# Patient Record
Sex: MALE | Race: WHITE | NOT HISPANIC OR LATINO
[De-identification: names, ages, dates, MRNs, and addresses within clinical notes are randomized per-mention and may not be internally consistent; named-entity substitution may affect disease eponyms.]

---

## 2017-01-08 ENCOUNTER — RX RENEWAL (OUTPATIENT)
Age: 72
End: 2017-01-08

## 2017-02-14 ENCOUNTER — MEDICATION RENEWAL (OUTPATIENT)
Age: 72
End: 2017-02-14

## 2017-03-06 ENCOUNTER — APPOINTMENT (OUTPATIENT)
Dept: UROLOGY | Facility: CLINIC | Age: 72
End: 2017-03-06

## 2017-12-12 ENCOUNTER — MEDICATION RENEWAL (OUTPATIENT)
Age: 72
End: 2017-12-12

## 2018-03-02 ENCOUNTER — MEDICATION RENEWAL (OUTPATIENT)
Age: 73
End: 2018-03-02

## 2018-05-01 ENCOUNTER — APPOINTMENT (OUTPATIENT)
Dept: UROLOGY | Facility: CLINIC | Age: 73
End: 2018-05-01
Payer: COMMERCIAL

## 2018-05-01 VITALS
SYSTOLIC BLOOD PRESSURE: 128 MMHG | HEART RATE: 62 BPM | RESPIRATION RATE: 17 BRPM | DIASTOLIC BLOOD PRESSURE: 75 MMHG | HEIGHT: 69 IN | WEIGHT: 175 LBS | BODY MASS INDEX: 25.92 KG/M2 | TEMPERATURE: 98.2 F

## 2018-05-01 DIAGNOSIS — Z78.9 OTHER SPECIFIED HEALTH STATUS: ICD-10-CM

## 2018-05-01 PROCEDURE — 99214 OFFICE O/P EST MOD 30 MIN: CPT

## 2018-05-04 LAB — PSA SERPL-MCNC: 0.78 NG/ML

## 2019-05-23 ENCOUNTER — APPOINTMENT (OUTPATIENT)
Dept: UROLOGY | Facility: CLINIC | Age: 74
End: 2019-05-23
Payer: COMMERCIAL

## 2019-05-23 PROCEDURE — 99213 OFFICE O/P EST LOW 20 MIN: CPT

## 2019-06-20 ENCOUNTER — APPOINTMENT (OUTPATIENT)
Dept: UROLOGY | Facility: CLINIC | Age: 74
End: 2019-06-20

## 2019-06-24 ENCOUNTER — APPOINTMENT (OUTPATIENT)
Dept: UROLOGY | Facility: CLINIC | Age: 74
End: 2019-06-24
Payer: COMMERCIAL

## 2019-06-24 PROCEDURE — 99214 OFFICE O/P EST MOD 30 MIN: CPT

## 2019-06-24 NOTE — LETTER BODY
[FreeTextEntry1] : Victor M Mares MD\par [address]\par [address]\[place] [address]\par P [phone]\par F [phone]\par \par Dr. Luca Chicas\par [address] [facility]\par [place], [place] \par \par Dear Dr. Mares,\par \par Reason for Visit: BPH. Bladder diverticulae. Bladder tumor\par \par This is a 74 -year-old gentleman with BPH and multiple bladder diverticula. Patient was found to bladder lesions within the bladder diverticulae underwent recent bladder biopsy and resection of these lesions. Patient returns for followup. Since the patient's last visit, patient is taking Flomax BID and Proscar regularly without any side effects or difficulties with the medication. He reports stable urinary symptoms with medication. His hematuria has resolved. Patient denies any changes in health. Patient denies any fevers chills or dysuria.The past medical history, family history and social history are unchanged. All other review of systems are negative. Patient denies any changes in medications. Medication list was reconciled.\par \par On examination, the patient is a healthy-appearing gentleman in no acute distress. He is alert and oriented follows commands. He has normal mood and affect. He is normocephalic. Oral no thrush. Neck is supple. Respirations are unlabored. His abdomen is soft and nontender. Liver is nonpalpable. Bladder is nonpalpable. No CVA tenderness. Neurologically he is grossly intact. No peripheral edema. Skin without gross abnormality. He has normal male external genitalia. Normal meatus. Bilateral testes are descended intrascrotally and normal to palpation. On rectal examination, there is normal sphincter tone. The prostate is clinically benign without focal induration or nodularity.\par \par Pathology demonstrated no evidence of bladder malignancy. 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 Bladder diverticula.\par \par I counseled the patient. I reassured the patient. There is no evidence of malignancy. I encouraged him to call Dr. Chicas for bladder outlet procedure. I encourage the patient to undergo Greenlight laser vaporization of prostate.  I counseled the patient regarding the procedure. The risks and benefits were discussed. Alternatives were given. I answered the patient questions. The patient will take the necessary preparations for the procedure. The patient will follow-up as directed and will contact me with any questions or concerns. Thank you for the opportunity to participate in the care of this patient. I'll keep you updated on his progress.\par \par Plan: Continue Flomax and Proscar. Consider Greenlight laser vaporization of prostate. Follow up as directed.

## 2019-06-24 NOTE — ADDENDUM
[FreeTextEntry1] : Entered by Cony Benjamin, acting as scribe for Dr. Salbador Angel.\par \par The documentation recorded by the scribe accurately reflects the service I personally performed and the decisions made by me.

## 2021-01-22 ENCOUNTER — APPOINTMENT (OUTPATIENT)
Dept: UROLOGY | Facility: CLINIC | Age: 76
End: 2021-01-22
Payer: COMMERCIAL

## 2021-01-22 VITALS
RESPIRATION RATE: 18 BRPM | TEMPERATURE: 97.7 F | WEIGHT: 166 LBS | OXYGEN SATURATION: 98 % | SYSTOLIC BLOOD PRESSURE: 164 MMHG | HEART RATE: 87 BPM | DIASTOLIC BLOOD PRESSURE: 90 MMHG | BODY MASS INDEX: 24.51 KG/M2

## 2021-01-22 PROCEDURE — 52000 CYSTOURETHROSCOPY: CPT

## 2021-01-22 PROCEDURE — 99072 ADDL SUPL MATRL&STAF TM PHE: CPT

## 2021-01-22 PROCEDURE — 99214 OFFICE O/P EST MOD 30 MIN: CPT | Mod: 25

## 2021-01-22 RX ORDER — CIPROFLOXACIN HYDROCHLORIDE 500 MG/1
500 TABLET, FILM COATED ORAL
Qty: 2 | Refills: 0 | Status: ACTIVE | COMMUNITY
Start: 2021-01-22

## 2021-01-22 RX ORDER — CIPROFLOXACIN HYDROCHLORIDE 500 MG/1
500 TABLET, FILM COATED ORAL
Refills: 0 | Status: COMPLETED | OUTPATIENT
Start: 2021-01-22

## 2021-01-22 NOTE — ADDENDUM
[FreeTextEntry1] : Entered by Alberto Colon, acting as scribe for Dr. Salbador Angel.\par \par The documentation recorded by the scribe accurately reflects the service I personally performed and the decisions made by me.\par

## 2021-01-22 NOTE — LETTER BODY
[FreeTextEntry1] : Victor M Mares MD\par [address]\par [address]\[place] [address]\par P [phone]\par F [phone]\par \par Dr. Luca Chicas\par [address] [facility]\par [place], [place] \par \par Dear Dr. Mares,\par \par Reason for Visit: BPH. Bladder diverticula. Bladder tumor. Gross hematuria.\par \par This is a 75 year-old gentleman with BPH and multiple bladder diverticula. Patient was found to bladder lesions within the bladder diverticula underwent recent bladder biopsy and resection of these lesions. He returns for follow-up. Since he was last seen, the patient reports gross hematuria. He continues to take Flomax BID and Proscar regularly without any side effects or difficulties with the medications. He reports of stable urinary symptoms with the medications. Patient denies any changes in health. Patient denies any fevers chills or dysuria.The past medical history, family history and social history are unchanged. All other review of systems are negative. Patient denies any changes in medications. Medication list was reconciled.\par \par On examination, the patient is a healthy-appearing gentleman in no acute distress. He is alert and oriented follows commands. He has normal mood and affect. He is normocephalic. Oral no thrush. Neck is supple. Respirations are unlabored. His abdomen is soft and nontender. Liver is nonpalpable. Bladder is nonpalpable. No CVA tenderness. Neurologically he is grossly intact. No peripheral edema. Skin without gross abnormality. He has normal male external genitalia. Normal meatus. Bilateral testes are descended intrascrotally and normal to palpation. On rectal examination, there is normal sphincter tone. The prostate is clinically benign without focal induration or nodularity.\par \par Assessment: BPH, stable symptoms on Flomax BID and Proscar. Bladder diverticula. Gross hematuria.\par \par I counseled the patient. In terms of his BPH, his symptoms are stable on medical therapy. I recommended he continue taking Flomax BID and Proscar. I renewed the patient's prescription for Flomax and Proscar today. I encouraged the patient to continue medications regularly as directed. In terms of his gross hematuria, I recommended the patient obtain urinalysis, urine cytology, urine culture, renal ultrasound, and cystoscopy. He will also obtain BMP and PSA today to ensure stability. Risks and alternatives were discussed. I answered the patient questions. The patient will follow-up as directed and will contact me with any questions or concerns. Thank you for the opportunity to participate in the care of Mr. DICKERSON. I will keep you updated on his progress.\par \par Plan: Continue Flomax and Proscar. Urinalysis. Urine cytology. Urine culture. BMP. PSA. Renal ultrasound. Cystoscopy.\par \par I personally reviewed ultrasound images with the patient today and images demonstrated both kidneys are normal in size and echogenicity without hydronephrosis, stones or solid masses present.\par \par His cystoscopy today demonstrated bladder outlet obstruction and neurogenic bladder.\par \par I discussed with the patient that his cystoscopy today demonstrated bladder outlet obstruction and neurogenic bladder. I recommended the patient undergo GreenLight laser vaporization of prostate. I counseled the patient regarding the procedure. The risks and benefits were discussed. Alternatives were given. I answered the patient questions. The patient will consider the procedure.\par \par Plan: Consider GreenLight laser vaporization of prostate. Follow-up as directed.

## 2021-01-23 ENCOUNTER — TRANSCRIPTION ENCOUNTER (OUTPATIENT)
Age: 76
End: 2021-01-23

## 2021-01-24 LAB
ANION GAP SERPL CALC-SCNC: 13 MMOL/L
APPEARANCE: ABNORMAL
BACTERIA UR CULT: NORMAL
BACTERIA: NEGATIVE
BILIRUBIN URINE: NEGATIVE
BLOOD URINE: ABNORMAL
BUN SERPL-MCNC: 14 MG/DL
CALCIUM SERPL-MCNC: 9.1 MG/DL
CHLORIDE SERPL-SCNC: 102 MMOL/L
CO2 SERPL-SCNC: 26 MMOL/L
COLOR: NORMAL
CREAT SERPL-MCNC: 1.04 MG/DL
GLUCOSE QUALITATIVE U: NEGATIVE
GLUCOSE SERPL-MCNC: 114 MG/DL
HYALINE CASTS: 3 /LPF
KETONES URINE: NEGATIVE
LEUKOCYTE ESTERASE URINE: ABNORMAL
MICROSCOPIC-UA: NORMAL
NITRITE URINE: NEGATIVE
PH URINE: 6.5
POTASSIUM SERPL-SCNC: 4.9 MMOL/L
PROTEIN URINE: ABNORMAL
PSA FREE FLD-MCNC: 11 %
PSA FREE SERPL-MCNC: 0.16 NG/ML
PSA SERPL-MCNC: 1.45 NG/ML
RED BLOOD CELLS URINE: 80 /HPF
SODIUM SERPL-SCNC: 141 MMOL/L
SPECIFIC GRAVITY URINE: 1.01
SQUAMOUS EPITHELIAL CELLS: 3 /HPF
UROBILINOGEN URINE: NORMAL
WHITE BLOOD CELLS URINE: 14 /HPF

## 2021-01-26 LAB — URINE CYTOLOGY: NORMAL

## 2021-01-27 ENCOUNTER — NON-APPOINTMENT (OUTPATIENT)
Age: 76
End: 2021-01-27

## 2022-02-11 ENCOUNTER — RX RENEWAL (OUTPATIENT)
Age: 77
End: 2022-02-11

## 2023-03-02 ENCOUNTER — APPOINTMENT (OUTPATIENT)
Dept: UROLOGY | Facility: CLINIC | Age: 78
End: 2023-03-02
Payer: COMMERCIAL

## 2023-03-02 DIAGNOSIS — R33.9 RETENTION OF URINE, UNSPECIFIED: ICD-10-CM

## 2023-03-02 DIAGNOSIS — N40.0 BENIGN PROSTATIC HYPERPLASIA WITHOUT LOWER URINARY TRACT SYMPMS: ICD-10-CM

## 2023-03-02 DIAGNOSIS — Z00.00 ENCOUNTER FOR GENERAL ADULT MEDICAL EXAMINATION W/OUT ABNORMAL FINDINGS: ICD-10-CM

## 2023-03-02 DIAGNOSIS — R31.0 GROSS HEMATURIA: ICD-10-CM

## 2023-03-02 PROCEDURE — 99214 OFFICE O/P EST MOD 30 MIN: CPT

## 2023-03-02 PROCEDURE — 88112 CYTOPATH CELL ENHANCE TECH: CPT | Mod: 26

## 2023-03-02 RX ORDER — MULTIVIT-MIN/FOLIC/VIT K/LYCOP 400-300MCG
1000 TABLET ORAL DAILY
Qty: 90 | Refills: 3 | Status: ACTIVE | COMMUNITY
Start: 2023-03-02 | End: 1900-01-01

## 2023-03-03 LAB
ANION GAP SERPL CALC-SCNC: 13 MMOL/L
APPEARANCE: ABNORMAL
BACTERIA UR CULT: NORMAL
BACTERIA: ABNORMAL
BILIRUBIN URINE: NEGATIVE
BLOOD URINE: ABNORMAL
BUN SERPL-MCNC: 17 MG/DL
CALCIUM SERPL-MCNC: 8.6 MG/DL
CHLORIDE SERPL-SCNC: 104 MMOL/L
CO2 SERPL-SCNC: 24 MMOL/L
COLOR: YELLOW
CREAT SERPL-MCNC: 0.92 MG/DL
EGFR: 86 ML/MIN/1.73M2
GLUCOSE QUALITATIVE U: NEGATIVE
GLUCOSE SERPL-MCNC: 72 MG/DL
KETONES URINE: NEGATIVE
LEUKOCYTE ESTERASE URINE: NEGATIVE
MICROSCOPIC-UA: NORMAL
NITRITE URINE: NEGATIVE
PH URINE: 6
POTASSIUM SERPL-SCNC: 4.6 MMOL/L
PROTEIN URINE: ABNORMAL
PSA FREE FLD-MCNC: 11 %
PSA FREE SERPL-MCNC: 0.06 NG/ML
PSA SERPL-MCNC: 0.59 NG/ML
RED BLOOD CELLS URINE: 142 /HPF
SODIUM SERPL-SCNC: 141 MMOL/L
SPECIFIC GRAVITY URINE: 1.02
SQUAMOUS EPITHELIAL CELLS: 2 /HPF
UROBILINOGEN URINE: NORMAL
WHITE BLOOD CELLS URINE: 13 /HPF

## 2023-03-04 NOTE — ADDENDUM
[FreeTextEntry1] : Entered by Lakshmi Woods, acting as scribe for Dr. Salbador Angel.\par The documentation recorded by the scribe accurately reflects the service I personally performed and the decisions made by me.

## 2023-03-04 NOTE — LETTER BODY
[FreeTextEntry1] : Victor M Mares MD\par [address]\[place] [address]\par P [phone]\par F [phone]\par \par Dr. Luca Chicas\par [address] [facility]\par [place], [place] \par \par Dear Dr. Mares and Dr. Chicas,\par \par Reason for Visit: BPH. Bladder diverticula. Previous gross hematuria.\par \par This is a 75 year-old gentleman with BPH and multiple bladder diverticula. Patient was found to bladder lesions within the bladder diverticula underwent recent bladder biopsy and resection of these lesions. His previous demonstrated bladder outlet obstruction and neurogenic bladder. He returns for follow-up. Since he was last seen, the patient he continues to take Flomax BID and Proscar regularly without any side effects or difficulties with the medications. He reports of stable urinary symptoms with the medications. He had a recent UTI. Patient denies any changes in health. Patient denies any fevers chills or dysuria.The past medical history, family history and social history are unchanged. All other review of systems are negative. Patient denies any changes in medications. Medication list was reconciled.\par \par On examination, the patient is a healthy-appearing gentleman in no acute distress. He is alert and oriented follows commands. He has normal mood and affect. He is normocephalic. Oral no thrush. Neck is supple. Respirations are unlabored. His abdomen is soft and nontender. Liver is nonpalpable. Bladder is nonpalpable. No CVA tenderness. Neurologically he is grossly intact. No peripheral edema. Skin without gross abnormality. He has normal male external genitalia. Normal meatus. Bilateral testes are descended intrascrotally and normal to palpation. On rectal examination, there is normal sphincter tone. The prostate is clinically benign without focal induration or nodularity.\par \par Assessment: BPH, stable symptoms on Flomax BID and Proscar. Bladder diverticula. Gross hematuria. UTI. \par \par I counseled the patient. In terms of his BPH, his symptoms are stable on medical therapy. I recommended he continue taking Flomax BID and Proscar. I renewed the patient's prescription for Flomax and Proscar today. I encouraged the patient to continue medications regularly as directed. In terms of his gross hematuria, I recommended the patient obtain urinalysis, urine cytology, and urine culture.. He will also obtain BMP and PSA today to ensure stability. In terms of his UTI, I recommended he take Prophylactic Methenamine Hippurate and Vitamin C. Risks and alternatives were discussed. I answered the patient questions. The patient will follow-up as directed and will contact me with any questions or concerns. Thank you for the opportunity to participate in the care of Mr. DICKERSON. I will keep you updated on his progress.\par \par Plan: Continue Flomax and Proscar. Prophylactic Methenamine Hippurate and Vitamin C. Urine culture. Urinalysis. Urine cytology. BMP. PSA. Follow up in 3 months.

## 2023-03-06 LAB — URINE CYTOLOGY: NORMAL

## 2024-02-25 DIAGNOSIS — N13.8 BENIGN PROSTATIC HYPERPLASIA WITH LOWER URINARY TRACT SYMPMS: ICD-10-CM

## 2024-02-25 DIAGNOSIS — R35.0 FREQUENCY OF MICTURITION: ICD-10-CM

## 2024-02-25 DIAGNOSIS — N40.1 BENIGN PROSTATIC HYPERPLASIA WITH LOWER URINARY TRACT SYMPMS: ICD-10-CM

## 2024-02-26 ENCOUNTER — APPOINTMENT (OUTPATIENT)
Dept: UROLOGY | Facility: CLINIC | Age: 79
End: 2024-02-26
Payer: COMMERCIAL

## 2024-02-26 PROCEDURE — G2211 COMPLEX E/M VISIT ADD ON: CPT

## 2024-02-26 PROCEDURE — 99214 OFFICE O/P EST MOD 30 MIN: CPT

## 2024-02-27 NOTE — HISTORY OF PRESENT ILLNESS
[FreeTextEntry1] : Follow-up BPH.  Flomax and Proscar.  Repeat PSA.  PSA previously 0.57.  Follow-up recurrent UTI and hematuria.  Urinalysis cytology.  Continue methenamine.  Follow-up 1 year.  Please refer to URO Consult note

## 2024-02-27 NOTE — ADDENDUM
[FreeTextEntry1] : Entered by Carla Funez, acting as scribe for Dr. Salbador Angel. The documentation recorded by the scribe accurately reflects the service I personally performed and the decisions made by me.

## 2024-02-27 NOTE — LETTER BODY
[FreeTextEntry1] : Victor M Mares  [address] [place] [address] P [phone] F [phone]  Dr. Luca Chicas [address] [place], [place]  Dear Dr. Mares and Dr. Chicas,  Reason for Visit: BPH. Bladder diverticula. Previous gross hematuria. Recurrent UTI.  This is a 78 year-old gentleman with BPH and multiple bladder diverticula. Patient was found to bladder lesions within the bladder diverticula underwent recent bladder biopsy and resection of these lesions. His previous demonstrated bladder outlet obstruction and neurogenic bladder. He returns for follow-up. Since he was last seen, the patient he continues to take Flomax BID and Proscar regularly without any side effects or difficulties with the medications. He reports of stable urinary symptoms with the medications. Patient also reports taking Prophylactic Methenamine Hippurate and Vitamin C for his recurrent UTI. Patient denies any changes in health. Patient denies any fevers chills or dysuria. The past medical history, family history and social history are unchanged. All other review of systems are negative. Patient denies any changes in medications. Medication list was reconciled.    On examination, the patient is a healthy-appearing gentleman in no acute distress. He is alert and oriented follows commands. He has normal mood and affect. He is normocephalic. Oral no thrush. Neck is supple. Respirations are unlabored. His abdomen is soft and nontender. Liver is nonpalpable. Bladder is nonpalpable. No CVA tenderness. Neurologically he is grossly intact. No peripheral edema. Skin without gross abnormality. He has normal male external genitalia. Normal meatus. Bilateral testes are descended intrascrotally and normal to palpation. On rectal examination, there is normal sphincter tone. The prostate is clinically benign without focal induration or nodularity.   His PSA previously 0.57, WNL.   Assessment: BPH, stable symptoms on Flomax BID and Proscar. Bladder diverticula. Gross hematuria. UTI.  I counseled the patient. In terms of his BPH, his symptoms are stable on medical therapy. I recommended he continue taking Flomax BID and Proscar. I renewed the patient's prescription for Flomax and Proscar today. I encouraged the patient to continue medications regularly as directed. In terms of his gross hematuria, I recommended the patient obtain urinalysis, urine cytology, and urine culture. His PSA previously 0.57. He will also obtain BMP and PSA today to ensure stability. In terms of his UTI, I recommended he take Prophylactic Methenamine Hippurate and Vitamin C. Risks and alternatives were discussed. I answered the patient questions. The patient will follow-up as directed and will contact me with any questions or concerns. Thank you for the opportunity to participate in the care of Mr. DICKERSON. I will keep you updated on his progress.  Plan: Continue Flomax and Proscar. Prophylactic Methenamine Hippurate and Vitamin C. Urine culture. Urinalysis. Urine cytology. BMP. PSA. Follow up in 1 year.

## 2024-03-02 DIAGNOSIS — R35.0 FREQUENCY OF MICTURITION: ICD-10-CM

## 2024-03-02 LAB
APPEARANCE: ABNORMAL
BACTERIA UR CULT: ABNORMAL
BACTERIA: NEGATIVE /HPF
BILIRUBIN URINE: NEGATIVE
BLOOD URINE: ABNORMAL
CAST: 1 /LPF
COLOR: YELLOW
EPITHELIAL CELLS: 1 /HPF
GLUCOSE QUALITATIVE U: NEGATIVE MG/DL
KETONES URINE: NEGATIVE MG/DL
LEUKOCYTE ESTERASE URINE: ABNORMAL
MICROSCOPIC-UA: NORMAL
NITRITE URINE: NEGATIVE
PH URINE: 6.5
PROTEIN URINE: 300 MG/DL
RED BLOOD CELLS URINE: 105 /HPF
REVIEW: NORMAL
SPECIFIC GRAVITY URINE: 1.02
URINE CYTOLOGY: NORMAL
UROBILINOGEN URINE: 0.2 MG/DL
WHITE BLOOD CELLS URINE: 205 /HPF

## 2024-03-02 RX ORDER — CEPHALEXIN 500 MG/1
500 TABLET ORAL
Qty: 15 | Refills: 0 | Status: ACTIVE | COMMUNITY
Start: 2024-03-02 | End: 1900-01-01

## 2024-03-25 RX ORDER — METHENAMINE HIPPURATE 1 G/1
1 TABLET ORAL
Qty: 180 | Refills: 3 | Status: ACTIVE | COMMUNITY
Start: 2023-03-02 | End: 1900-01-01

## 2024-07-15 ENCOUNTER — NON-APPOINTMENT (OUTPATIENT)
Age: 79
End: 2024-07-15

## 2024-07-17 ENCOUNTER — TRANSCRIPTION ENCOUNTER (OUTPATIENT)
Age: 79
End: 2024-07-17

## 2024-07-21 ENCOUNTER — EMERGENCY (EMERGENCY)
Facility: HOSPITAL | Age: 79
LOS: 1 days | Discharge: HOME | End: 2024-07-21
Attending: EMERGENCY MEDICINE
Payer: COMMERCIAL

## 2024-07-21 VITALS
DIASTOLIC BLOOD PRESSURE: 80 MMHG | SYSTOLIC BLOOD PRESSURE: 140 MMHG | OXYGEN SATURATION: 99 % | HEART RATE: 55 BPM | RESPIRATION RATE: 17 BRPM

## 2024-07-21 VITALS — WEIGHT: 175.05 LBS | HEIGHT: 69 IN

## 2024-07-21 DIAGNOSIS — Z98.89 UNDEFINED: Chronic | ICD-10-CM

## 2024-07-21 LAB
ALBUMIN SERPL ELPH-MCNC: 4.1 G/DL — SIGNIFICANT CHANGE UP (ref 3.3–5)
ALP SERPL-CCNC: 69 U/L — SIGNIFICANT CHANGE UP (ref 40–120)
ALT FLD-CCNC: 16 U/L — SIGNIFICANT CHANGE UP (ref 10–45)
ANION GAP SERPL CALC-SCNC: 13 MMOL/L — SIGNIFICANT CHANGE UP (ref 5–17)
APPEARANCE UR: ABNORMAL
APTT BLD: 33.2 SEC — SIGNIFICANT CHANGE UP (ref 24.5–35.6)
AST SERPL-CCNC: 18 U/L — SIGNIFICANT CHANGE UP (ref 10–40)
BACTERIA # UR AUTO: NEGATIVE /HPF — SIGNIFICANT CHANGE UP
BASE EXCESS BLDV CALC-SCNC: -1.5 MMOL/L — SIGNIFICANT CHANGE UP (ref -2–3)
BASOPHILS # BLD AUTO: 0.02 K/UL — SIGNIFICANT CHANGE UP (ref 0–0.2)
BASOPHILS # BLD AUTO: 0.03 K/UL — SIGNIFICANT CHANGE UP (ref 0–0.2)
BASOPHILS NFR BLD AUTO: 0.4 % — SIGNIFICANT CHANGE UP (ref 0–2)
BASOPHILS NFR BLD AUTO: 0.6 % — SIGNIFICANT CHANGE UP (ref 0–2)
BILIRUB SERPL-MCNC: 0.9 MG/DL — SIGNIFICANT CHANGE UP (ref 0.2–1.2)
BILIRUB UR-MCNC: NEGATIVE — SIGNIFICANT CHANGE UP
BUN SERPL-MCNC: 22 MG/DL — SIGNIFICANT CHANGE UP (ref 7–23)
CA-I SERPL-SCNC: 1.21 MMOL/L — SIGNIFICANT CHANGE UP (ref 1.15–1.33)
CALCIUM SERPL-MCNC: 8.8 MG/DL — SIGNIFICANT CHANGE UP (ref 8.4–10.5)
CAST: 2 /LPF — SIGNIFICANT CHANGE UP (ref 0–4)
CHLORIDE BLDV-SCNC: 106 MMOL/L — SIGNIFICANT CHANGE UP (ref 96–108)
CHLORIDE SERPL-SCNC: 107 MMOL/L — SIGNIFICANT CHANGE UP (ref 96–108)
CO2 BLDV-SCNC: 26 MMOL/L — SIGNIFICANT CHANGE UP (ref 22–26)
CO2 SERPL-SCNC: 20 MMOL/L — LOW (ref 22–31)
COD CRY URNS QL: PRESENT
COLOR SPEC: ABNORMAL
CREAT SERPL-MCNC: 1 MG/DL — SIGNIFICANT CHANGE UP (ref 0.5–1.3)
DIFF PNL FLD: ABNORMAL
EGFR: 78 ML/MIN/1.73M2 — SIGNIFICANT CHANGE UP
EOSINOPHIL # BLD AUTO: 0.29 K/UL — SIGNIFICANT CHANGE UP (ref 0–0.5)
EOSINOPHIL # BLD AUTO: 0.29 K/UL — SIGNIFICANT CHANGE UP (ref 0–0.5)
EOSINOPHIL NFR BLD AUTO: 5.3 % — SIGNIFICANT CHANGE UP (ref 0–6)
EOSINOPHIL NFR BLD AUTO: 6 % — SIGNIFICANT CHANGE UP (ref 0–6)
GAS PNL BLDV: 136 MMOL/L — SIGNIFICANT CHANGE UP (ref 136–145)
GAS PNL BLDV: SIGNIFICANT CHANGE UP
GAS PNL BLDV: SIGNIFICANT CHANGE UP
GLUCOSE BLDV-MCNC: 79 MG/DL — SIGNIFICANT CHANGE UP (ref 70–99)
GLUCOSE SERPL-MCNC: 99 MG/DL — SIGNIFICANT CHANGE UP (ref 70–99)
GLUCOSE UR QL: NEGATIVE MG/DL — SIGNIFICANT CHANGE UP
HCO3 BLDV-SCNC: 25 MMOL/L — SIGNIFICANT CHANGE UP (ref 22–29)
HCT VFR BLD CALC: 34.4 % — LOW (ref 39–50)
HCT VFR BLD CALC: 35.9 % — LOW (ref 39–50)
HCT VFR BLDA CALC: 39 % — SIGNIFICANT CHANGE UP (ref 39–51)
HGB BLD CALC-MCNC: 13.1 G/DL — SIGNIFICANT CHANGE UP (ref 12.6–17.4)
HGB BLD-MCNC: 12 G/DL — LOW (ref 13–17)
HGB BLD-MCNC: 12.3 G/DL — LOW (ref 13–17)
IMM GRANULOCYTES NFR BLD AUTO: 0.7 % — SIGNIFICANT CHANGE UP (ref 0–0.9)
IMM GRANULOCYTES NFR BLD AUTO: 0.8 % — SIGNIFICANT CHANGE UP (ref 0–0.9)
INR BLD: 1.04 RATIO — SIGNIFICANT CHANGE UP (ref 0.85–1.18)
KETONES UR-MCNC: NEGATIVE MG/DL — SIGNIFICANT CHANGE UP
LACTATE BLDV-MCNC: 1.1 MMOL/L — SIGNIFICANT CHANGE UP (ref 0.5–2)
LEUKOCYTE ESTERASE UR-ACNC: ABNORMAL
LYMPHOCYTES # BLD AUTO: 1.09 K/UL — SIGNIFICANT CHANGE UP (ref 1–3.3)
LYMPHOCYTES # BLD AUTO: 1.13 K/UL — SIGNIFICANT CHANGE UP (ref 1–3.3)
LYMPHOCYTES # BLD AUTO: 20 % — SIGNIFICANT CHANGE UP (ref 13–44)
LYMPHOCYTES # BLD AUTO: 23.3 % — SIGNIFICANT CHANGE UP (ref 13–44)
MCHC RBC-ENTMCNC: 31.5 PG — SIGNIFICANT CHANGE UP (ref 27–34)
MCHC RBC-ENTMCNC: 31.8 PG — SIGNIFICANT CHANGE UP (ref 27–34)
MCHC RBC-ENTMCNC: 34.3 GM/DL — SIGNIFICANT CHANGE UP (ref 32–36)
MCHC RBC-ENTMCNC: 34.9 GM/DL — SIGNIFICANT CHANGE UP (ref 32–36)
MCV RBC AUTO: 91.2 FL — SIGNIFICANT CHANGE UP (ref 80–100)
MCV RBC AUTO: 91.8 FL — SIGNIFICANT CHANGE UP (ref 80–100)
MONOCYTES # BLD AUTO: 0.44 K/UL — SIGNIFICANT CHANGE UP (ref 0–0.9)
MONOCYTES # BLD AUTO: 0.56 K/UL — SIGNIFICANT CHANGE UP (ref 0–0.9)
MONOCYTES NFR BLD AUTO: 10.3 % — SIGNIFICANT CHANGE UP (ref 2–14)
MONOCYTES NFR BLD AUTO: 9.1 % — SIGNIFICANT CHANGE UP (ref 2–14)
NEUTROPHILS # BLD AUTO: 2.93 K/UL — SIGNIFICANT CHANGE UP (ref 1.8–7.4)
NEUTROPHILS # BLD AUTO: 3.44 K/UL — SIGNIFICANT CHANGE UP (ref 1.8–7.4)
NEUTROPHILS NFR BLD AUTO: 60.4 % — SIGNIFICANT CHANGE UP (ref 43–77)
NEUTROPHILS NFR BLD AUTO: 63.1 % — SIGNIFICANT CHANGE UP (ref 43–77)
NITRITE UR-MCNC: NEGATIVE — SIGNIFICANT CHANGE UP
NRBC # BLD: 0 /100 WBCS — SIGNIFICANT CHANGE UP (ref 0–0)
NRBC # BLD: 0 /100 WBCS — SIGNIFICANT CHANGE UP (ref 0–0)
PCO2 BLDV: 47 MMHG — SIGNIFICANT CHANGE UP (ref 42–55)
PH BLDV: 7.33 — SIGNIFICANT CHANGE UP (ref 7.32–7.43)
PH UR: 5.5 — SIGNIFICANT CHANGE UP (ref 5–8)
PLATELET # BLD AUTO: 240 K/UL — SIGNIFICANT CHANGE UP (ref 150–400)
PLATELET # BLD AUTO: 285 K/UL — SIGNIFICANT CHANGE UP (ref 150–400)
PO2 BLDV: 30 MMHG — SIGNIFICANT CHANGE UP (ref 25–45)
POTASSIUM BLDV-SCNC: 4 MMOL/L — SIGNIFICANT CHANGE UP (ref 3.5–5.1)
POTASSIUM SERPL-MCNC: 4 MMOL/L — SIGNIFICANT CHANGE UP (ref 3.5–5.3)
POTASSIUM SERPL-SCNC: 4 MMOL/L — SIGNIFICANT CHANGE UP (ref 3.5–5.3)
PROT SERPL-MCNC: 6.2 G/DL — SIGNIFICANT CHANGE UP (ref 6–8.3)
PROT UR-MCNC: 100 MG/DL
PROTHROM AB SERPL-ACNC: 11.4 SEC — SIGNIFICANT CHANGE UP (ref 9.5–13)
RBC # BLD: 3.77 M/UL — LOW (ref 4.2–5.8)
RBC # BLD: 3.91 M/UL — LOW (ref 4.2–5.8)
RBC # FLD: 12.3 % — SIGNIFICANT CHANGE UP (ref 10.3–14.5)
RBC # FLD: 12.5 % — SIGNIFICANT CHANGE UP (ref 10.3–14.5)
RBC CASTS # UR COMP ASSIST: 1787 /HPF — HIGH (ref 0–4)
REVIEW: SIGNIFICANT CHANGE UP
SAO2 % BLDV: 44.5 % — LOW (ref 67–88)
SODIUM SERPL-SCNC: 140 MMOL/L — SIGNIFICANT CHANGE UP (ref 135–145)
SP GR SPEC: 1.02 — SIGNIFICANT CHANGE UP (ref 1–1.03)
SQUAMOUS # UR AUTO: 12 /HPF — HIGH (ref 0–5)
UROBILINOGEN FLD QL: 0.2 MG/DL — SIGNIFICANT CHANGE UP (ref 0.2–1)
WBC # BLD: 4.85 K/UL — SIGNIFICANT CHANGE UP (ref 3.8–10.5)
WBC # BLD: 5.45 K/UL — SIGNIFICANT CHANGE UP (ref 3.8–10.5)
WBC # FLD AUTO: 4.85 K/UL — SIGNIFICANT CHANGE UP (ref 3.8–10.5)
WBC # FLD AUTO: 5.45 K/UL — SIGNIFICANT CHANGE UP (ref 3.8–10.5)
WBC UR QL: 49 /HPF — HIGH (ref 0–5)

## 2024-07-21 PROCEDURE — 51702 INSERT TEMP BLADDER CATH: CPT

## 2024-07-21 PROCEDURE — 85610 PROTHROMBIN TIME: CPT

## 2024-07-21 PROCEDURE — 84132 ASSAY OF SERUM POTASSIUM: CPT

## 2024-07-21 PROCEDURE — 85730 THROMBOPLASTIN TIME PARTIAL: CPT

## 2024-07-21 PROCEDURE — 51703 INSERT BLADDER CATH COMPLEX: CPT

## 2024-07-21 PROCEDURE — 82947 ASSAY GLUCOSE BLOOD QUANT: CPT

## 2024-07-21 PROCEDURE — 87086 URINE CULTURE/COLONY COUNT: CPT

## 2024-07-21 PROCEDURE — 85014 HEMATOCRIT: CPT

## 2024-07-21 PROCEDURE — 82435 ASSAY OF BLOOD CHLORIDE: CPT

## 2024-07-21 PROCEDURE — 83605 ASSAY OF LACTIC ACID: CPT

## 2024-07-21 PROCEDURE — 96374 THER/PROPH/DIAG INJ IV PUSH: CPT | Mod: XU

## 2024-07-21 PROCEDURE — 82330 ASSAY OF CALCIUM: CPT

## 2024-07-21 PROCEDURE — 99284 EMERGENCY DEPT VISIT MOD MDM: CPT | Mod: 25

## 2024-07-21 PROCEDURE — 80053 COMPREHEN METABOLIC PANEL: CPT

## 2024-07-21 PROCEDURE — 82803 BLOOD GASES ANY COMBINATION: CPT

## 2024-07-21 PROCEDURE — 74177 CT ABD & PELVIS W/CONTRAST: CPT | Mod: MC

## 2024-07-21 PROCEDURE — 99283 EMERGENCY DEPT VISIT LOW MDM: CPT | Mod: 25

## 2024-07-21 PROCEDURE — 81001 URINALYSIS AUTO W/SCOPE: CPT

## 2024-07-21 PROCEDURE — 84295 ASSAY OF SERUM SODIUM: CPT

## 2024-07-21 PROCEDURE — 99285 EMERGENCY DEPT VISIT HI MDM: CPT

## 2024-07-21 PROCEDURE — 85025 COMPLETE CBC W/AUTO DIFF WBC: CPT

## 2024-07-21 PROCEDURE — 36415 COLL VENOUS BLD VENIPUNCTURE: CPT

## 2024-07-21 PROCEDURE — 74177 CT ABD & PELVIS W/CONTRAST: CPT | Mod: 26,MC

## 2024-07-21 PROCEDURE — 85018 HEMOGLOBIN: CPT

## 2024-07-21 RX ORDER — SODIUM CHLORIDE 0.9 % (FLUSH) 0.9 %
1000 SYRINGE (ML) INJECTION ONCE
Refills: 0 | Status: COMPLETED | OUTPATIENT
Start: 2024-07-21 | End: 2024-07-21

## 2024-07-21 RX ORDER — CEFPODOXIME PROXETIL 50 MG/5 ML
1 SUSPENSION, RECONSTITUTED, ORAL (ML) ORAL
Qty: 20 | Refills: 0
Start: 2024-07-21 | End: 2024-07-30

## 2024-07-21 RX ORDER — CEFTRIAXONE SODIUM 500 MG
1000 VIAL (EA) INJECTION ONCE
Refills: 0 | Status: COMPLETED | OUTPATIENT
Start: 2024-07-21 | End: 2024-07-21

## 2024-07-21 RX ADMIN — Medication 1000 MILLILITER(S): at 11:43

## 2024-07-21 RX ADMIN — Medication 100 MILLIGRAM(S): at 16:50

## 2024-07-21 NOTE — ED PROVIDER NOTE - PROGRESS NOTE DETAILS
Aide Dixon, PGY-3 DO:  Patient evaluated by urology, patient TBDC. Patient to f/u with Dr. Angel outpatient. Patient is agreeable. Informed patient since he is not bleeding it is okay to resume Eliquis. Patient agreeable. Will DC.

## 2024-07-21 NOTE — CONSULT NOTE ADULT - SUBJECTIVE AND OBJECTIVE BOX
HPI:  79y Male with PMHx    PAST MEDICAL & SURGICAL HISTORY:  BPH (benign prostatic hypertrophy)      Eczema      History of tonsillectomy  childhood          FAMILY HISTORY:  Family history of diabetes mellitus (DM) (Mother)    Family history of coronary artery disease (Father)    No known  malignancy     Denies alcohol and drug abuse, nonsmoker     MEDICATIONS  (STANDING):    MEDICATIONS  (PRN):    Allergies    sulfa drugs (Joint Pain; Rash)    Intolerances      REVIEW OF SYSTEMS: Pertinent positives and negatives as stated in HPI, otherwise negative    Vital signs  T(C): 36.7 (07-21-24 @ 18:58), Max: 37 (07-21-24 @ 11:00)  HR: 56 (07-21-24 @ 18:58)  BP: 180/84 (07-21-24 @ 18:58)  SpO2: 100% (07-21-24 @ 18:58)  Wt(kg): --    Physical Exam  Gen: NAD  HEENT: normocephalic, atraumatic, no scleral icterus  Pulm: CTA b/l, No respiratory distress, no subcostal retractions, no accessory muscle use   CV: S1 S2, RRR,  no JVD  Abd: Soft, NT, ND, no rebound tenderness or guarding  : Uncircumcised/Circumcised, no lesions.  No discharge or blood at urethral meatus.  Testes descended bilaterally.  Testes and epididymis nontender bilaterally.  Cremasteric reflex present bilaterally.  MSK:  No CVAT, Moving all extremities, full ROM in all extremities, No edema present  NEURO: A&Ox3, no focal neurological deficits, CN 2-12 grossly intact  SKIN: warm, dry, no rash.    LABS:  CBC                       12.3   5.45  )-----------( 285      ( 21 Jul 2024 11:37 )             35.9     BMP   07-21    140  |  107  |  22  ----------------------------<  99  4.0   |  20<L>  |  1.00    Ca    8.8      21 Jul 2024 11:37    TPro  6.2  /  Alb  4.1  /  TBili  0.9  /  DBili  x   /  AST  18  /  ALT  16  /  AlkPhos  69  07-21    PT/INR - ( 21 Jul 2024 11:37 )   PT: 11.4 sec;   INR: 1.04 ratio         PTT - ( 21 Jul 2024 11:37 )  PTT:33.2 sec    Urinalysis Basic - ( 21 Jul 2024 11:37 )    Color: x / Appearance: x / SG: x / pH: x  Gluc: 99 mg/dL / Ketone: x  / Bili: x / Urobili: x   Blood: x / Protein: x / Nitrite: x   Leuk Esterase: x / RBC: x / WBC x   Sq Epi: x / Non Sq Epi: x / Bacteria: x      Urine Cx:   Blood Cx:    Radiology: HPI:  79y Male with PMHx BPH, multiple bladder diverticula, previous gross hematuria, recurrent UTI, and newly diagnosed A.fib, presents to ED for hematuria. Urology consulted for hematuria evaluation. Patient states last Sunday he developed hematuria, color described as dark brown, no passage of clots, and was seen at an urgent care and evaluated for UTI and was given antibiotics Cipro although his urine does not show any signs of infection. Patient states he has been diagnosed as A. fib recently and was stated on Eliquis by his his cardiologist on Thursday. He reports the hematuria had improved after taking the Cipro, but recurred after one dose of Eliquis. He called his cardiologist who advised patient to stop the Eliquis, however bleeding has persisted. Patient on Flomax BID and Proscar. Denies urinary retention, suprapubic discomfort/pressure, increase frequency, urgency, or flank pain. Denies fever, chills, sob, chest pain. Patient followed with urologist Dr. Angel.  In ED, patient afebrile.  cc (baseline 200cc). Law was placed by ED, clear yellow color urine.      PAST MEDICAL & SURGICAL HISTORY:  BPH (benign prostatic hypertrophy)      Eczema      History of tonsillectomy  childhood          FAMILY HISTORY:  Family history of diabetes mellitus (DM) (Mother)    Family history of coronary artery disease (Father)    No known  malignancy     Denies alcohol and drug abuse, nonsmoker     MEDICATIONS  (STANDING):    MEDICATIONS  (PRN):    Allergies    sulfa drugs (Joint Pain; Rash)    Intolerances      REVIEW OF SYSTEMS: Pertinent positives and negatives as stated in HPI, otherwise negative    Vital signs  T(C): 36.7 (07-21-24 @ 18:58), Max: 37 (07-21-24 @ 11:00)  HR: 56 (07-21-24 @ 18:58)  BP: 180/84 (07-21-24 @ 18:58)  SpO2: 100% (07-21-24 @ 18:58)  Wt(kg): --    Physical Exam  Gen: NAD  HEENT: normocephalic, atraumatic, no scleral icterus  Pulm: CTA b/l, No respiratory distress, no subcostal retractions, no accessory muscle use   CV: S1 S2, RRR,  no JVD  Abd: Soft, NT, ND, no rebound tenderness or guarding  : Law in place with clear yellow color, no suprapubic tenderness.  MSK:  No CVAT bilaterally  NEURO: A&Ox3    LABS:  CBC                       12.3   5.45  )-----------( 285      ( 21 Jul 2024 11:37 )             35.9     BMP   07-21    140  |  107  |  22  ----------------------------<  99  4.0   |  20<L>  |  1.00    Ca    8.8      21 Jul 2024 11:37    TPro  6.2  /  Alb  4.1  /  TBili  0.9  /  DBili  x   /  AST  18  /  ALT  16  /  AlkPhos  69  07-21    PT/INR - ( 21 Jul 2024 11:37 )   PT: 11.4 sec;   INR: 1.04 ratio         PTT - ( 21 Jul 2024 11:37 )  PTT:33.2 sec    Urinalysis Basic - ( 21 Jul 2024 11:37 )    Color: x / Appearance: x / SG: x / pH: x  Gluc: 99 mg/dL / Ketone: x  / Bili: x / Urobili: x   Blood: x / Protein: x / Nitrite: x   Leuk Esterase: x / RBC: x / WBC x   Sq Epi: x / Non Sq Epi: x / Bacteria: x      Urine Cx: Pending

## 2024-07-21 NOTE — ED PROVIDER NOTE - PATIENT PORTAL LINK FT
You can access the FollowMyHealth Patient Portal offered by [facility] by registering at the following website: [email or web address]. By joining [employer]’s FollowMyHealth portal, you will also be able to view your health information using other applications (apps) compatible with our system.

## 2024-07-21 NOTE — ED PROVIDER NOTE - PHYSICAL EXAMINATION
GEN: NAD, awake, eyes open spontaneously  EYES: normal conjunctiva, perrl  ENT: NCAT, MMM, Trachea midline  CHEST/LUNGS: Non-tachypneic, CTAB, bilateral breath sounds  CARDIAC: Non-tachycardic, normal perfusion  ABDOMEN: Soft, NTND, No rebound/guarding  : No CVA tenderness bilaterally   MSK: No edema, no gross deformity of extremities  SKIN: No rashes, no petechiae, no vesicles  NEURO: CN grossly intact, normal coordination, no focal motor or sensory deficits  PSYCH: Alert, appropriate, cooperative, with capacity and insight

## 2024-07-21 NOTE — ED ADULT TRIAGE NOTE - MODE OF ARRIVAL
Walk in Was on TPN, now stopped since transitioning towards comfort measures. OK to have D5 fluids per patient request.

## 2024-07-21 NOTE — ED ADULT NURSE REASSESSMENT NOTE - NS ED NURSE REASSESS COMMENT FT1
18F coude tip indwelling hankins placed under sterile technique with 2 RNs at bedside. pt tolerated well, 350mL Dark brown urine drained.

## 2024-07-21 NOTE — ED PROVIDER NOTE - NSFOLLOWUPINSTRUCTIONS_ED_ALL_ED_FT
Today you were seen for blood in your urine.   You were seen by urology and you had a Law catheter placed.   You were discharged with this Law. You well need to follow with Dr. Angel to have this Law removed.   Please resume your Eliquis per your primary's recommendation.     Please follow up with the urologist within the 2-3 days.   Drink plenty of fluids and rest.    Hematuria, Adult    Hematuria is blood in the urine. Blood may be visible in the urine, or it may be identified with a test. This condition can be caused by infections of the bladder, urethra, kidney, or prostate. Other possible causes include:  Kidney stones.  Cancer of the urinary tract.  Too much calcium in the urine.  Conditions that are passed from parent to child (inherited conditions).  Exercise that requires a lot of energy.  Infections can usually be treated with medicine, and a kidney stone usually will pass through your urine. If neither of these is the cause of your hematuria, more tests may be needed to identify the cause of your symptoms.    It is very important to tell your health care provider about any blood in your urine, even if it is painless or the blood stops without treatment. Blood in the urine, when it happens and then stops and then happens again, can be a symptom of a very serious condition, including cancer. There is no pain in the initial stages of many urinary cancers.    Follow these instructions at home:  Medicines    Take over-the-counter and prescription medicines only as told by your health care provider.  If you were prescribed an antibiotic medicine, take it as told by your health care provider. Do not stop taking the antibiotic even if you start to feel better.  Eating and drinking    Drink enough fluid to keep your urine pale yellow. It is recommended that you drink 3–4 quarts (2.8–3.8 L) a day. If you have been diagnosed with an infection, drinking cranberry juice in addition to large amounts of water is recommended.  Avoid caffeine, tea, and carbonated beverages. These tend to irritate the bladder.  Avoid alcohol because it may irritate the prostate (in males).  General instructions    If you have been diagnosed with a kidney stone, follow your health care provider's instructions about straining your urine to catch the stone.  Empty your bladder often. Avoid holding urine for long periods of time.  If you are female:  After a bowel movement, wipe from front to back and use each piece of toilet paper only once.  Empty your bladder before and after sex.  Pay attention to any changes in your symptoms. Tell your health care provider about any changes or any new symptoms.  It is up to you to get the results of any tests. Ask your health care provider, or the department that is doing the test, when your results will be ready.  Keep all follow-up visits. This is important.  Contact a health care provider if:  You develop back pain.  You have a fever or chills.  You have nausea or vomiting.  Your symptoms do not improve after 3 days.  Your symptoms get worse.  Get help right away if:  You develop severe vomiting and are unable to take medicine without vomiting.  You develop severe pain in your back or abdomen even though you are taking medicine.  You pass a large amount of blood in your urine.  You pass blood clots in your urine.  You feel very weak or like you might faint.  You faint.  Summary  Hematuria is blood in the urine. It has many possible causes.  It is very important that you tell your health care provider about any blood in your urine, even if it is painless or the blood stops without treatment.  Take over-the-counter and prescription medicines only as told by your health care provider.  Drink enough fluid to keep your urine pale yellow.  This information is not intended to replace advice given to you by your health care provider. Make sure you discuss any questions you have with your health care provider.

## 2024-07-21 NOTE — ED PROVIDER NOTE - CLINICAL SUMMARY MEDICAL DECISION MAKING FREE TEXT BOX
79M hx of BPH and newly diagnosed Afib (started on Metoprolol and Eliquis) presenting with 1 week of hematuria. Completed course of Cipro today. He has not taken Eliquis since Thursday, but bleeding persisting. Does not appear to be in retention, no passage of clots. Will obtain labs to eval for anemia, kidney function, coags. Urine studies to eval for infection and CT abd/pel. Hydrate with fludis and reassess. 79M hx of BPH and newly diagnosed Afib (started on Metoprolol and Eliquis) presenting with 1 week of hematuria. Completed course of Cipro today. He has not taken Eliquis since Thursday, but bleeding persisting. Does not appear to be in retention, no passage of clots. Will obtain labs to eval for anemia, kidney function, coags. Urine studies to eval for infection and CT abd/pel. Hydrate with fludis and reassess.  RGUJRAL 78yo male hx UTI, Bladder Polyp presents with daughter for hematuria. Patient states last sunday he developed symptoms and was seen at an urgent care and evaluated for UTI and was given antibiotics Cipro although his urine does not show any signs of infection.  Patient states he has been wearing a Holter monitor and was told that he had an episode of A-fib and was started on eliquis by his cardiologist took 1 dose on Thursday.  Patient states after taking the Cipro his hematuria had improved but recurred after dose of the anticoagulant unsure if it is related.  Patient also noted some back pain at the time.  Denies any nausea vomiting fevers chills.  States that hematuria has increased since Thursday, no clots and able to urinate but wanted to come in for evaluation.  Denies any symptoms of lightheadedness weakness. On exam, patient is well-appearing, no acute distress, abdomen soft nontender, no CVA tenderness.  Will obtain labs CT to evaluate for kidney stone, pyelonephritis.  Patient states he is able to urinate without difficulty will give IV fluids and monitor urine. Patient states his urologist is on vacation will return tomorrow.  Patient stopped his Eliquis after the first dose per his cardiologist, currently in normal sinus rhythm .

## 2024-07-21 NOTE — ED ADULT NURSE NOTE - OBJECTIVE STATEMENT
78 y/o male with PMH BPH, afib, presenting to ED for blood in urine x 3 days, states he started eliquis and noted blood in his urine the same day. His pcp told him to stop taking the eliquis. pt also reports lower back pain yesterday that has since resolved. states he is on chronic abx for chronic utis. Upon exam pt A&Ox3 gross neuro intact,   no difficulty speaking in complete sentences, s1s2 heart sounds heard,  cole x4, abdomen soft nontender nondistended, skin intact. pt denies chest pain, sob, ha, n/v/d, abdominal pain, f/c, urinary symptoms.

## 2024-07-21 NOTE — ED PROVIDER NOTE - OBJECTIVE STATEMENT
79M with hx of BPH, newly diagnosed Afib, presents to the ED complaining of hematuria x 1 week. Patient was initially evaluated at urgent care last week and was prescribed a course of cipro, which he finished this AM. Symptoms improved temporarily but then recurred Thursday after taking his first dose of Eliquis 5mg. He called his cardiologist who advised patient to stop the Eliquis, however bleeding has persisted. Denies passage of clots. States he is still making normal amount of urine. No dysuria, retention, frequency, abdominal pain, nausea, or vomiting. Reporting intermittent bilateral lower back pain, however pain free at this time. Denies lightheadedness, chest pain, shortness of breath, or fatigue. Endorses hx of bladder polyps. He has never had indwelling hankins catheter.     Uro: Dr. Angel   Card: Dr. Harris

## 2024-07-21 NOTE — ED ADULT TRIAGE NOTE - CHIEF COMPLAINT QUOTE
hematuria x 1 week. placed on cipro by urgent care and hematuria cleared- newly placed on eliquis for afib and now bleeding returned

## 2024-07-21 NOTE — ED PROVIDER NOTE - CARE PROVIDER_API CALL
Salbador Angel  [facility]  Urology  [address] [place] [address], [address]  [place] [address]  Phone: [phone]  Fax: [phone]  Follow Up Time: Urgent

## 2024-07-21 NOTE — CONSULT NOTE ADULT - ASSESSMENT
79y Male with PMHx BPH, multiple bladder diverticula, previous gross hematuria, recurrent UTI, and newly diagnosed A.fib, presents to ED for hematuria. Urology consulted for hematuria evaluation. Completed Cipro course this morning. On Flomax BID and Proscar. In ED, patient afebrile. WBC 5.45, H/H 12.3/35.9, Cr 1.  cc (baseline 200cc). Law was placed by ED, clear yellow color urine without clots.     - No acute  intervention at this time  - Can restart eliquis since urine is clear  - Advice follow up outpatient urologist Dr. Angel for catheter removal   - If patient experiences heavy bleeding, acute change in pain not controlled with PO medications or signs of urinary retention, return to ER   - Case discussed with Dr. Brown and Dr. Becker

## 2024-07-21 NOTE — ED PROVIDER NOTE - NSICDXFAMILYHX_GEN_ALL_CORE_FT
FAMILY HISTORY:  Father  Still living? No  Family history of coronary artery disease, Age at diagnosis: Age Unknown    Mother  Still living? Yes, Estimated age: Age Unknown  Family history of diabetes mellitus (DM), Age at diagnosis: Age Unknown

## 2024-07-22 ENCOUNTER — NON-APPOINTMENT (OUTPATIENT)
Age: 79
End: 2024-07-22

## 2024-07-22 LAB
CULTURE RESULTS: NO GROWTH — SIGNIFICANT CHANGE UP
SPECIMEN SOURCE: SIGNIFICANT CHANGE UP

## 2024-07-23 ENCOUNTER — NON-APPOINTMENT (OUTPATIENT)
Age: 79
End: 2024-07-23

## 2024-07-24 ENCOUNTER — APPOINTMENT (OUTPATIENT)
Dept: UROLOGY | Facility: CLINIC | Age: 79
End: 2024-07-24

## 2024-07-25 ENCOUNTER — APPOINTMENT (OUTPATIENT)
Dept: UROLOGY | Facility: CLINIC | Age: 79
End: 2024-07-25

## 2024-07-25 VITALS — DIASTOLIC BLOOD PRESSURE: 73 MMHG | OXYGEN SATURATION: 98 % | SYSTOLIC BLOOD PRESSURE: 127 MMHG | HEART RATE: 74 BPM

## 2024-07-25 PROCEDURE — A4216: CPT | Mod: NC

## 2024-07-25 PROCEDURE — 51700 IRRIGATION OF BLADDER: CPT

## 2024-07-25 PROCEDURE — 99214 OFFICE O/P EST MOD 30 MIN: CPT | Mod: 25

## 2024-07-25 NOTE — LETTER BODY
[FreeTextEntry1] : Victor M Mares  [address] [place] [address] P [phone] F [phone]  Dr. Luca Chicas [address] [place], [place]  Dear Dr. Mares and Dr. Chicas,  Reason for Visit: BPH. Bladder diverticula. Previous gross hematuria. Recurrent UTI.  This is a 79 year-old gentleman with BPH and multiple bladder diverticula. Patient was found to bladder lesions within the bladder diverticula underwent previous bladder biopsy and resection of these lesions. His previous demonstrated bladder outlet obstruction and neurogenic bladder. He returns for follow-up and voiding trial. Since he was last seen, the patient he continues to take Flomax BID and Proscar regularly without any side effects or difficulties with the medications. He reports of urinary retention despite medications. Patient also reports taking Prophylactic Methenamine Hippurate and Vitamin C for his recurrent UTI. Patient denies any changes in health. Patient denies any fevers chills or dysuria. The past medical history, family history and social history are unchanged. All other review of systems are negative. Patient denies any changes in medications. Medication list was reconciled.    On examination, the patient is a older -appearing gentleman in no acute distress. He is alert and oriented follows commands. He has normal mood and affect. He is normocephalic. Oral no thrush. Neck is supple. Respirations are unlabored. His abdomen is soft and nontender. Liver is nonpalpable. Bladder is nonpalpable. No CVA tenderness. Neurologically he is grossly intact. No peripheral edema. Skin without gross abnormality.    His PSA previously 0.57, WNL.   Patient was given a voiding trial today. The patient's bladder was filled with 300 cc of water. Patient was able to void spontaneously.  After 4 hours, he voided several times an his post-void residual on bladder scan today was 300 cc.   Patient declined Law catheter placement.  Assessment: BPH, persistent symptoms on Flomax BID and Proscar. Bladder diverticula. Gross hematuria. UTI.  I counseled the patient. In terms of his BPH, he reports urinary retention despite medical therapy. The patient passed his voiding trial today. However, his PVR was 300 cc. Patient declined Law catheter placement today. He will follow-up on Monday. Risks and alternatives were discussed. I answered the patient questions. The patient will follow-up as directed and will contact me with any questions or concerns. Thank you for the opportunity to participate in the care of Mr. DICKERSON. I will keep you updated on his progress.  Plan: Continue Flomax and Proscar. PVR. Follow up on Monday.  I spent 30-minutes time today on all issues related to this patient on today date of service including non face to face time.

## 2024-07-25 NOTE — HISTORY OF PRESENT ILLNESS
[FreeTextEntry1] : Follow-up BPH.  Urinary retention despite Flomax and Proscar.  Patient was given a voiding trial today. The patient's bladder was filled with 300 cc of water. Patient was able to void spontaneously.  Post-void residual on bladder scan today was 300 cc.   Patient declined Law placement.  Follow-up Monday.  Please refer to URO Consult Note.

## 2024-07-25 NOTE — ADDENDUM
[FreeTextEntry1] : Entered by Delfino Nix, acting as scribe for Dr. Salbador Angel. The documentation recorded by the scribe accurately reflects the service I personally performed and the decisions made by me.

## 2024-07-25 NOTE — LETTER BODY
[FreeTextEntry1] : Victor M Mares  [address] [place] [address] P [phone] F [phone]  Dr. Luca Chicas [address] [place], [place]  Dear Dr. Mares and Dr. Chicas,  Reason for Visit: BPH. Bladder diverticula. Previous gross hematuria. Recurrent UTI.  This is a 79 year-old gentleman with BPH and multiple bladder diverticula. Patient was found to bladder lesions within the bladder diverticula underwent previous bladder biopsy and resection of these lesions. His previous demonstrated bladder outlet obstruction and neurogenic bladder. He returns for follow-up and voiding trial. Since he was last seen, the patient he continues to take Flomax BID and Proscar regularly without any side effects or difficulties with the medications. He reports of urinary retention despite medications. Patient also reports taking Prophylactic Methenamine Hippurate and Vitamin C for his recurrent UTI. Patient denies any changes in health. Patient denies any fevers chills or dysuria. The past medical history, family history and social history are unchanged. All other review of systems are negative. Patient denies any changes in medications. Medication list was reconciled.    On examination, the patient is a older -appearing gentleman in no acute distress. He is alert and oriented follows commands. He has normal mood and affect. He is normocephalic. Oral no thrush. Neck is supple. Respirations are unlabored. His abdomen is soft and nontender. Liver is nonpalpable. Bladder is nonpalpable. No CVA tenderness. Neurologically he is grossly intact. No peripheral edema. Skin without gross abnormality.    His PSA previously 0.57, WNL.   Patient was given a voiding trial today. The patient's bladder was filled with 300 cc of water. Patient was able to void spontaneously.  After 4 hours, he voided several times an his post-void residual on bladder scan today was 300 cc.   Patient declined Law catheter placement.  Assessment: BPH, persistent symptoms on Flomax BID and Proscar. Bladder diverticula. Gross hematuria. UTI.  I counseled the patient. In terms of his BPH, he reports urinary retention despite medical therapy. The patient passed his voiding trial today. However, his PVR was 300 cc. Patient declined Law catheter placement today. He will follow-up on Monday. Risks and alternatives were discussed. I answered the patient questions. The patient will follow-up as directed and will contact me with any questions or concerns. Thank you for the opportunity to participate in the care of Mr. DICKERSON. I will keep you updated on his progress.  Plan: Continue Flomax and Proscar. PVR. Follow up on Monday.  I spent 30-minutes time today on all issues related to this patient on today date of service including non face to face time.

## 2024-07-29 ENCOUNTER — APPOINTMENT (OUTPATIENT)
Dept: UROLOGY | Facility: CLINIC | Age: 79
End: 2024-07-29
Payer: COMMERCIAL

## 2024-07-29 DIAGNOSIS — R31.0 GROSS HEMATURIA: ICD-10-CM

## 2024-07-29 LAB
APPEARANCE: ABNORMAL
BACTERIA: NEGATIVE /HPF
BILIRUBIN URINE: NEGATIVE
BLOOD URINE: ABNORMAL
CAST: 1 /LPF
COLOR: ABNORMAL
EPITHELIAL CELLS: 4 /HPF
GLUCOSE QUALITATIVE U: NEGATIVE MG/DL
KETONES URINE: NEGATIVE MG/DL
LEUKOCYTE ESTERASE URINE: ABNORMAL
MICROSCOPIC-UA: NORMAL
NITRITE URINE: NEGATIVE
PH URINE: 6
PROTEIN URINE: 100 MG/DL
RED BLOOD CELLS URINE: 1465 /HPF
SPECIFIC GRAVITY URINE: 1.01
UROBILINOGEN URINE: 0.2 MG/DL
WHITE BLOOD CELLS URINE: 21 /HPF

## 2024-07-29 PROCEDURE — 51798 US URINE CAPACITY MEASURE: CPT

## 2024-07-29 PROCEDURE — 99214 OFFICE O/P EST MOD 30 MIN: CPT

## 2024-07-29 PROCEDURE — G2211 COMPLEX E/M VISIT ADD ON: CPT

## 2024-07-29 NOTE — HISTORY OF PRESENT ILLNESS
[FreeTextEntry1] : Follow-up BPH.  Flomax.  .  Stable.  Consider cystoscopy UDS.  Patient on Plavix.  Please refer to URO Consult Note.

## 2024-07-29 NOTE — LETTER BODY
[FreeTextEntry1] : Victor M Mares  [address] [place] [address] P [phone] F [phone]  Dr. Luca Chicas [address] [place], [place]  Dear Dr. Mares and Dr. Chicas,  Reason for Visit: BPH. Bladder diverticula. Previous gross hematuria. Recurrent UTI.  This is a 79 year-old gentleman with BPH and multiple bladder diverticula. Patient was found to bladder lesions within the bladder diverticula underwent previous bladder biopsy and resection of these lesions. His previous demonstrated bladder outlet obstruction and neurogenic bladder. He returns for follow-up. Since he was last seen, the patient he continues to take Flomax BID and Proscar regularly without any side effects or difficulties with the medications. He reports of urinary retention despite medications. The patient reports being on Plavix. Patient also reports taking Prophylactic Methenamine Hippurate and Vitamin C for his recurrent UTI. Patient denies any changes in health. Patient denies any fevers chills or dysuria. The past medical history, family history and social history are unchanged. All other review of systems are negative. Patient denies any changes in medications. Medication list was reconciled.    On examination, the patient is a older -appearing gentleman in no acute distress. He is alert and oriented follows commands. He has normal mood and affect. He is normocephalic. Oral no thrush. Neck is supple. Respirations are unlabored. His abdomen is soft and nontender. Liver is nonpalpable. Bladder is nonpalpable. No CVA tenderness. Neurologically he is grossly intact. No peripheral edema. Skin without gross abnormality.   His PSA previously 0.57, WNL. His previous urinalysis demonstrated evidence of hematuria, 105 RBC/HPF and pyuria, 205 WBC/HPF. His urine culturew as positive for infection. His urine cytology was negative.    Post-void residual on bladder scan today was 300 cc which was stable.    Assessment: BPH, persistent symptoms on Flomax BID and Proscar. Bladder diverticula. Gross hematuria. UTI.  I counseled the patient. In terms of his BPH, I recommended the patient continue Flomax BID and Proscar as directed. His PVR today was 300 cc which is stable. I recommended the patient consider cystoscopy and UDS to evaluate the urinary outlet. I counseled the patient regarding the procedure. Risks and alternatives were discussed. I answered the patient questions.  In terms of recurrent UTI, patient continue methenamine and vitamin C.  He will contact me if he wishes to proceed with the procedure. I recommended the patient repeat urinalysis, urine culture, and urine cytology to look for infections and high grade urothelial carcinoma. The patient will follow-up as directed and will contact me with any questions or concerns. Thank you for the opportunity to participate in the care of Mr. DICKERSON. I will keep you updated on his progress.  Plan: Continue Flomax and Proscar. PVR. Consider Cystoscopy UDS.  Urinalysis, Urine cytology. Urine culture. Follow up as directed.  Continue methenamine and vitamin C.  I spent 30-minutes time today on all issues related to this patient on today date of service including non face to face time.

## 2024-07-30 LAB
BACTERIA UR CULT: NORMAL
URINE CYTOLOGY: NORMAL

## 2024-08-02 ENCOUNTER — NON-APPOINTMENT (OUTPATIENT)
Age: 79
End: 2024-08-02

## 2024-08-05 ENCOUNTER — APPOINTMENT (OUTPATIENT)
Dept: UROLOGY | Facility: CLINIC | Age: 79
End: 2024-08-05

## 2024-08-09 ENCOUNTER — TRANSCRIPTION ENCOUNTER (OUTPATIENT)
Age: 79
End: 2024-08-09

## 2024-08-29 ENCOUNTER — APPOINTMENT (OUTPATIENT)
Dept: UROLOGY | Facility: CLINIC | Age: 79
End: 2024-08-29

## 2024-09-04 ENCOUNTER — TRANSCRIPTION ENCOUNTER (OUTPATIENT)
Age: 79
End: 2024-09-04

## 2024-09-20 ENCOUNTER — TRANSCRIPTION ENCOUNTER (OUTPATIENT)
Age: 79
End: 2024-09-20

## 2024-09-23 ENCOUNTER — APPOINTMENT (OUTPATIENT)
Dept: UROLOGY | Facility: CLINIC | Age: 79
End: 2024-09-23

## 2024-09-23 ENCOUNTER — APPOINTMENT (OUTPATIENT)
Dept: UROLOGY | Facility: CLINIC | Age: 79
End: 2024-09-23
Payer: COMMERCIAL

## 2024-09-23 ENCOUNTER — OUTPATIENT (OUTPATIENT)
Dept: OUTPATIENT SERVICES | Facility: HOSPITAL | Age: 79
LOS: 1 days | End: 2024-09-23
Payer: COMMERCIAL

## 2024-09-23 VITALS — DIASTOLIC BLOOD PRESSURE: 73 MMHG | SYSTOLIC BLOOD PRESSURE: 146 MMHG | HEART RATE: 61 BPM

## 2024-09-23 DIAGNOSIS — R35.0 FREQUENCY OF MICTURITION: ICD-10-CM

## 2024-09-23 DIAGNOSIS — Z98.89 OTHER SPECIFIED POSTPROCEDURAL STATES: Chronic | ICD-10-CM

## 2024-09-23 DIAGNOSIS — R31.29 OTHER MICROSCOPIC HEMATURIA: ICD-10-CM

## 2024-09-23 DIAGNOSIS — N40.1 BENIGN PROSTATIC HYPERPLASIA WITH LOWER URINARY TRACT SYMPMS: ICD-10-CM

## 2024-09-23 DIAGNOSIS — R31.0 GROSS HEMATURIA: ICD-10-CM

## 2024-09-23 DIAGNOSIS — N13.8 BENIGN PROSTATIC HYPERPLASIA WITH LOWER URINARY TRACT SYMPMS: ICD-10-CM

## 2024-09-23 LAB
ANION GAP SERPL CALC-SCNC: 10 MMOL/L
APPEARANCE: ABNORMAL
BACTERIA: NEGATIVE /HPF
BILIRUBIN URINE: NEGATIVE
BLOOD URINE: ABNORMAL
BUN SERPL-MCNC: 17 MG/DL
CALCIUM SERPL-MCNC: 8.9 MG/DL
CAST: 0 /LPF
CHLORIDE SERPL-SCNC: 105 MMOL/L
CO2 SERPL-SCNC: 23 MMOL/L
COLOR: YELLOW
CREAT SERPL-MCNC: 0.99 MG/DL
EGFR: 78 ML/MIN/1.73M2
EPITHELIAL CELLS: 1 /HPF
GLUCOSE QUALITATIVE U: NEGATIVE MG/DL
GLUCOSE SERPL-MCNC: 97 MG/DL
KETONES URINE: NEGATIVE MG/DL
LEUKOCYTE ESTERASE URINE: ABNORMAL
MICROSCOPIC-UA: NORMAL
NITRITE URINE: NEGATIVE
PH URINE: 6.5
POTASSIUM SERPL-SCNC: 5.1 MMOL/L
PROTEIN URINE: 100 MG/DL
PSA FREE FLD-MCNC: 10 %
PSA FREE SERPL-MCNC: 0.04 NG/ML
PSA SERPL-MCNC: 0.38 NG/ML
RED BLOOD CELLS URINE: 29 /HPF
SODIUM SERPL-SCNC: 138 MMOL/L
SPECIFIC GRAVITY URINE: 1.01
UROBILINOGEN URINE: 0.2 MG/DL
WHITE BLOOD CELLS URINE: 13 /HPF

## 2024-09-23 PROCEDURE — 99214 OFFICE O/P EST MOD 30 MIN: CPT | Mod: 25

## 2024-09-23 PROCEDURE — 52000 CYSTOURETHROSCOPY: CPT

## 2024-09-23 NOTE — HISTORY OF PRESENT ILLNESS
[FreeTextEntry1] : Patient reports gross hematuria since last week.  Hematuria resolved after he held Eliquis on Thursday.  He held Eliquis because of dental work.  Plan: CT scan.  Cystoscopy.  PSA.  Cytology.  Urine culture.  Please refer to URO Consult Note.

## 2024-09-23 NOTE — LETTER BODY
[FreeTextEntry1] : Victor M Mares  [address] [place] [address] P [phone] F [phone]  Dr. Luca Chicas [address] [place], [place]  Dear Dr. Mares and Dr. Chicas,  Reason for Visit: BPH. Bladder diverticula. Previous gross hematuria. Recurrent UTI.  This is a 79 year-old gentleman with BPH and multiple bladder diverticula. Patient was found to bladder lesions within the bladder diverticula underwent previous bladder biopsy and resection of these lesions. His previous demonstrated bladder outlet obstruction and neurogenic bladder. The patient previously reported urinary retention despite being on Flomax BID and Proscar. He returns for follow-up. Since he was last seen, the patient reports of gross hematuria since last week. He states that his hematuria resolved after he held Eliquis on Thursday. He held Eliquis because of dental work. The patient is on Plavix. Patient also reports taking Prophylactic Methenamine Hippurate and Vitamin C for his recurrent UTI. Patient denies any changes in health. Patient denies any fevers chills or dysuria. The past medical history, family history and social history are unchanged. All other review of systems are negative. Patient denies any changes in medications. Medication list was reconciled.    On examination, the patient is a older -appearing gentleman in no acute distress. He is alert and oriented follows commands. He has normal mood and affect. He is normocephalic. Oral no thrush. Neck is supple. Respirations are unlabored. His abdomen is soft and nontender. Liver is nonpalpable. Bladder is nonpalpable. No CVA tenderness. Neurologically he is grossly intact. No peripheral edema. Skin without gross abnormality.   His PSA previously 0.57, WNL. His previous urinalysis demonstrated evidence of hematuria, 1465 RBC/HPF and pyuria, 21 WBC/HPF. His urine culture was negative for infection. His urine cytology was negative.    Assessment: BPH, persistent symptoms on Flomax BID and Proscar. Bladder diverticula. Gross hematuria. UTI.  I counseled the patient. In terms of his gross hematuria, the patient reports of episode that began last week which resolved after he held Eliquis. I recommended patient obtains CT scan and cystoscopy for further evaluation. I counseled the patient regarding the procedure. The risks and benefits were discussed. Alternatives were given. I answered the patient's questions. The patient will take the necessary preparations for the procedure. I recommended the patient repeat PSA and BMP to ensure stability. I recommended the patient obtain urinalysis, urine culture, and urine cytology to look for infections and high grade urothelial carcinoma. The patient will follow-up as directed and will contact me with any questions or concerns. Thank you for the opportunity to participate in the care of Mr. DICKERSON. I will keep you updated on his progress.  Plan: CT scan. Cystoscopy. Urinalysis, Urine cytology. Urine culture. PSA. BMP. Follow-up as directed.  I spent 30-minutes time today on all issues related to this patient on today date of service including non face to face time.

## 2024-09-24 DIAGNOSIS — N40.1 BENIGN PROSTATIC HYPERPLASIA WITH LOWER URINARY TRACT SYMPTOMS: ICD-10-CM

## 2024-09-24 DIAGNOSIS — R31.29 OTHER MICROSCOPIC HEMATURIA: ICD-10-CM

## 2024-09-24 DIAGNOSIS — R31.0 GROSS HEMATURIA: ICD-10-CM

## 2024-09-24 LAB — BACTERIA UR CULT: NORMAL

## 2024-09-25 LAB — URINE CYTOLOGY: NORMAL

## 2024-10-05 ENCOUNTER — OUTPATIENT (OUTPATIENT)
Dept: OUTPATIENT SERVICES | Facility: HOSPITAL | Age: 79
LOS: 1 days | End: 2024-10-05
Payer: COMMERCIAL

## 2024-10-05 ENCOUNTER — APPOINTMENT (OUTPATIENT)
Dept: CT IMAGING | Facility: IMAGING CENTER | Age: 79
End: 2024-10-05

## 2024-10-05 DIAGNOSIS — R31.29 OTHER MICROSCOPIC HEMATURIA: ICD-10-CM

## 2024-10-05 DIAGNOSIS — Z98.89 OTHER SPECIFIED POSTPROCEDURAL STATES: Chronic | ICD-10-CM

## 2024-10-05 DIAGNOSIS — Z00.8 ENCOUNTER FOR OTHER GENERAL EXAMINATION: ICD-10-CM

## 2024-10-05 PROCEDURE — 74178 CT ABD&PLV WO CNTR FLWD CNTR: CPT

## 2024-10-05 PROCEDURE — 74178 CT ABD&PLV WO CNTR FLWD CNTR: CPT | Mod: 26

## 2024-10-29 ENCOUNTER — NON-APPOINTMENT (OUTPATIENT)
Age: 79
End: 2024-10-29

## 2024-11-01 ENCOUNTER — TRANSCRIPTION ENCOUNTER (OUTPATIENT)
Age: 79
End: 2024-11-01

## 2025-01-30 ENCOUNTER — APPOINTMENT (OUTPATIENT)
Dept: UROLOGY | Facility: CLINIC | Age: 80
End: 2025-01-30
Payer: COMMERCIAL

## 2025-01-30 DIAGNOSIS — R31.0 GROSS HEMATURIA: ICD-10-CM

## 2025-01-30 PROCEDURE — 99214 OFFICE O/P EST MOD 30 MIN: CPT

## 2025-01-30 PROCEDURE — G2211 COMPLEX E/M VISIT ADD ON: CPT | Mod: NC

## 2025-01-31 LAB
APPEARANCE: ABNORMAL
BACTERIA: NEGATIVE /HPF
BILIRUBIN URINE: NEGATIVE
BLOOD URINE: ABNORMAL
CAST: 0 /LPF
COLOR: YELLOW
EPITHELIAL CELLS: 5 /HPF
GLUCOSE QUALITATIVE U: NEGATIVE MG/DL
KETONES URINE: NEGATIVE MG/DL
LEUKOCYTE ESTERASE URINE: ABNORMAL
MICROSCOPIC-UA: NORMAL
NITRITE URINE: NEGATIVE
PH URINE: 6
PROTEIN URINE: 100 MG/DL
RED BLOOD CELLS URINE: 480 /HPF
SPECIFIC GRAVITY URINE: 1.01
URINE CYTOLOGY: NORMAL
UROBILINOGEN URINE: 0.2 MG/DL
WHITE BLOOD CELLS URINE: 50 /HPF

## 2025-02-20 ENCOUNTER — APPOINTMENT (OUTPATIENT)
Dept: UROLOGY | Facility: CLINIC | Age: 80
End: 2025-02-20

## 2025-04-16 ENCOUNTER — NON-APPOINTMENT (OUTPATIENT)
Age: 80
End: 2025-04-16